# Patient Record
Sex: FEMALE | Race: WHITE | NOT HISPANIC OR LATINO | Employment: FULL TIME | RURAL
[De-identification: names, ages, dates, MRNs, and addresses within clinical notes are randomized per-mention and may not be internally consistent; named-entity substitution may affect disease eponyms.]

---

## 2017-01-02 ENCOUNTER — OFFICE VISIT (OUTPATIENT)
Dept: RETAIL CLINIC | Facility: CLINIC | Age: 57
End: 2017-01-02

## 2017-01-02 VITALS
DIASTOLIC BLOOD PRESSURE: 92 MMHG | HEART RATE: 84 BPM | BODY MASS INDEX: 36.7 KG/M2 | WEIGHT: 215 LBS | TEMPERATURE: 98.8 F | HEIGHT: 64 IN | RESPIRATION RATE: 16 BRPM | SYSTOLIC BLOOD PRESSURE: 150 MMHG | OXYGEN SATURATION: 97 %

## 2017-01-02 DIAGNOSIS — J01.00 ACUTE MAXILLARY SINUSITIS, RECURRENCE NOT SPECIFIED: Primary | ICD-10-CM

## 2017-01-02 PROCEDURE — 99213 OFFICE O/P EST LOW 20 MIN: CPT | Performed by: NURSE PRACTITIONER

## 2017-01-02 RX ORDER — AMOXICILLIN 500 MG/1
500 CAPSULE ORAL 2 TIMES DAILY
Qty: 20 CAPSULE | Refills: 0 | Status: SHIPPED | OUTPATIENT
Start: 2017-01-02 | End: 2017-01-12

## 2017-01-02 RX ORDER — DEXAMETHASONE 4 MG/1
4 TABLET ORAL 2 TIMES DAILY WITH MEALS
Qty: 6 TABLET | Refills: 0 | Status: SHIPPED | OUTPATIENT
Start: 2017-01-02

## 2017-01-02 RX ORDER — FLUOXETINE HYDROCHLORIDE 40 MG/1
40 CAPSULE ORAL DAILY
COMMUNITY

## 2017-01-02 RX ORDER — LISINOPRIL 20 MG/1
20 TABLET ORAL DAILY
COMMUNITY

## 2017-01-02 RX ORDER — BUSPIRONE HYDROCHLORIDE 10 MG/1
10 TABLET ORAL 3 TIMES DAILY
COMMUNITY

## 2017-01-02 NOTE — LETTER
January 2, 2017     Patient: Tatyana Morataya   YOB: 1960   Date of Visit: 1/2/2017       To Whom It May Concern:    It is my medical opinion that Tatyana Morataya should remain out of work until 4 January 2017.           Sincerely,        ELIZABETH Nieves  PROVIDER [facility]

## 2017-01-02 NOTE — PATIENT INSTRUCTIONS
-Discussed dx with Patient. Decrease smoking while sx are present.   -Medication administration instructions given. Patient requested medications on [employer] 4 dollar list. Rx meds on list as requested.   -If sx worsen or do not improve seek higher level care  -Patient expressed verbal understanding of plan of care and rationale for interventions.    Work excuse note written for patient with return to work date of 4 January 2016

## 2017-01-02 NOTE — PROGRESS NOTES
"Subjective   Tatyana Morataya is a 56 y.o. female. Patient comes into clinic today with concerns regarding:     \"Coughing, congested, sore throat, nasal congestion.\"    History of Present Illness  Sx have been present for 2 days. +chills, sore throat, sinus pain and congestin, HA, PND, dry cough, shortness of breath, wheezing.     OTC Medications used: Tylenol cold, ibuprofen    The following portions of the patient's history were reviewed and updated as appropriate: allergies, current medications, past family history, past medical history, past social history, past surgical history and problem list.    Review of Systems   Constitutional: Positive for chills. Negative for fever.   HENT: Positive for congestion, postnasal drip, sinus pressure and sore throat. Negative for ear pain.    Respiratory: Positive for cough and shortness of breath.    Cardiovascular: Negative for chest pain.   Gastrointestinal: Negative for abdominal pain, diarrhea, nausea and vomiting.   Neurological: Positive for headaches.       Allergies   Allergen Reactions   • Erythromycin Shortness Of Breath and Rash       Past Medical History   Diagnosis Date   • Arthritis    • Depression    • Hypertension          Current Outpatient Prescriptions:   •  busPIRone (BUSPAR) 10 MG tablet, Take 10 mg by mouth 3 (Three) Times a Day., Disp: , Rfl:   •  FLUoxetine (PROzac) 40 MG capsule, Take 40 mg by mouth Daily., Disp: , Rfl:   •  lisinopril (PRINIVIL,ZESTRIL) 20 MG tablet, Take 20 mg by mouth Daily., Disp: , Rfl:   •  amoxicillin (AMOXIL) 500 MG capsule, Take 1 capsule by mouth 2 (Two) Times a Day for 10 days., Disp: 20 capsule, Rfl: 0  •  dexamethasone (DECADRON) 4 MG tablet, Take 1 tablet by mouth 2 (Two) Times a Day With Meals., Disp: 6 tablet, Rfl: 0    PCP: Denies     Tobacco: 1ppd    Objective   Physical Exam   Constitutional: She is oriented to person, place, and time. She appears well-developed and well-nourished. She appears ill.   HENT:   Right " "Ear: External ear and ear canal normal. Tympanic membrane is not injected and not erythematous. A middle ear effusion is present.   Left Ear: External ear and ear canal normal. Tympanic membrane is not injected and not erythematous. A middle ear effusion is present.   Nose: Mucosal edema and rhinorrhea present. Right sinus exhibits maxillary sinus tenderness. Right sinus exhibits no frontal sinus tenderness. Left sinus exhibits maxillary sinus tenderness. Left sinus exhibits no frontal sinus tenderness.   Mouth/Throat: Uvula is midline. Posterior oropharyngeal erythema present. No oropharyngeal exudate or posterior oropharyngeal edema.   Neck: Neck supple. No thyromegaly present.   Cardiovascular: Normal rate and regular rhythm.    Pulmonary/Chest: Effort normal. She has wheezes in the right upper field, the right middle field, the left upper field and the left middle field. She has no rhonchi.   Lymphadenopathy:        Head (right side): Submental, submandibular and tonsillar adenopathy present. No preauricular adenopathy present.        Head (left side): Submandibular and tonsillar adenopathy present. No submental, no preauricular and no posterior auricular adenopathy present.     She has no cervical adenopathy.        Right: No supraclavicular adenopathy present.        Left: No supraclavicular adenopathy present.   Neurological: She is alert and oriented to person, place, and time.   CN II-XII grossly intact   Skin: Skin is warm and dry.   Psychiatric: She has a normal mood and affect. Her behavior is normal.   Vitals reviewed.      Visit Vitals   • /92   • Pulse 84   • Temp 98.8 °F (37.1 °C) (Tympanic)   • Resp 16   • Ht 64\" (162.6 cm)   • Wt 215 lb (97.5 kg)   • SpO2 97%   • Breastfeeding No   • BMI 36.9 kg/m2       Assessment/Plan   Tatyana was seen today for cough.    Diagnoses and all orders for this visit:    Acute maxillary sinusitis, recurrence not specified  -     dexamethasone (DECADRON) 4 MG " tablet; Take 1 tablet by mouth 2 (Two) Times a Day With Meals.  -     amoxicillin (AMOXIL) 500 MG capsule; Take 1 capsule by mouth 2 (Two) Times a Day for 10 days.     -Discussed dx with Patient. Decrease smoking while sx are present.   -Medication administration instructions given. Patient requested medications on [employer] 4 dollar list. Rx meds on list as requested.   -If sx worsen or do not improve seek higher level care  -Patient expressed verbal understanding of plan of care and rationale for interventions.    Work excuse note written for patient with return to work date of 4 January 2016

## 2020-11-06 DIAGNOSIS — Z51.5 HOSPICE CARE: Primary | ICD-10-CM

## 2020-11-06 RX ORDER — MORPHINE SULFATE 20 MG/ML
20 SOLUTION ORAL
Qty: 30 ML | Refills: 0 | Status: SHIPPED | OUTPATIENT
Start: 2020-11-06 | End: 2020-11-07 | Stop reason: SDUPTHER

## 2020-11-06 RX ORDER — MORPHINE SULFATE 20 MG/ML
20 SOLUTION ORAL
Qty: 30 ML | Refills: 0 | Status: SHIPPED | OUTPATIENT
Start: 2020-11-06 | End: 2020-11-06 | Stop reason: SDUPTHER

## 2020-11-07 DIAGNOSIS — Z51.5 HOSPICE CARE: ICD-10-CM

## 2020-11-07 RX ORDER — MORPHINE SULFATE 20 MG/ML
20 SOLUTION ORAL
Qty: 60 ML | Refills: 0 | Status: SHIPPED | OUTPATIENT
Start: 2020-11-07 | End: 2020-11-09 | Stop reason: SDUPTHER

## 2020-11-09 DIAGNOSIS — Z51.5 HOSPICE CARE: ICD-10-CM

## 2020-11-09 RX ORDER — MORPHINE SULFATE 20 MG/ML
20 SOLUTION ORAL
Qty: 30 ML | Refills: 0 | Status: SHIPPED | OUTPATIENT
Start: 2020-11-09

## 2020-11-11 DIAGNOSIS — Z51.5 HOSPICE CARE: Primary | ICD-10-CM

## 2020-11-11 RX ORDER — FENTANYL 100 UG/H
1 PATCH TRANSDERMAL
Qty: 10 EACH | Refills: 0 | Status: SHIPPED | OUTPATIENT
Start: 2020-11-11